# Patient Record
(demographics unavailable — no encounter records)

---

## 2024-10-21 NOTE — PHYSICAL EXAM
[NL (40)] : plantar flexion 40 degrees [5___] : eversion 5[unfilled]/5 [2+] : posterior tibialis pulse: 2+ [Normal] : saphenous nerve sensation normal [] : patient ambulates without assistive device [Right] : right ankle [Weight -] : weightbearing [FreeTextEntry9] : Achilles insertion spur, Mustapha's deformity.  [TWNoteComboBox7] : dorsiflexion 15 degrees [de-identified] : inversion 15 degrees [de-identified] : eversion 15 degrees

## 2024-10-21 NOTE — HISTORY OF PRESENT ILLNESS
[de-identified] : Patient is a 59 year old female here for her right ankle. She reports posterior heel pain with start up since early October, 2024. On 10/17/24 she tripped walking into an elevator which made the pain worse. She is currently [facility] in [employer].  No prior right ankle issues. [FreeTextEntry1] : right achilles.

## 2024-10-21 NOTE — DISCUSSION/SUMMARY
[de-identified] : WBAT in supportive footwear, heel lifts.   Recommend a course of PT. Ice to affected area. Stretching exercises recommended and demonstrated to patient.    The patient was explained the options as well as benefits of over the counter verses prescription strength nonsteroidal anti-inflammatory medication. Prescription strength medication is recommended to suppress chronic inflammation. The patient opts for a prescription strength medication.

## 2024-10-28 NOTE — HISTORY OF PRESENT ILLNESS
[Sudden] : sudden [6] : 6 [5] : 5 [Dull/Aching] : dull/aching [Intermittent] : intermittent [Rest] : rest [Meds] : meds [Walking] : walking [Stairs] : stairs [] : yes